# Patient Record
Sex: FEMALE | Race: WHITE | NOT HISPANIC OR LATINO | URBAN - METROPOLITAN AREA
[De-identification: names, ages, dates, MRNs, and addresses within clinical notes are randomized per-mention and may not be internally consistent; named-entity substitution may affect disease eponyms.]

---

## 2021-07-28 ENCOUNTER — NEW PATIENT COMPREHENSIVE (OUTPATIENT)
Dept: URBAN - METROPOLITAN AREA CLINIC 50 | Facility: CLINIC | Age: 76
End: 2021-07-28

## 2021-07-28 DIAGNOSIS — H52.4: ICD-10-CM

## 2021-07-28 DIAGNOSIS — H35.3130: ICD-10-CM

## 2021-07-28 DIAGNOSIS — H25.13: ICD-10-CM

## 2021-07-28 DIAGNOSIS — H35.363: ICD-10-CM

## 2021-07-28 PROCEDURE — 92134 CPTRZ OPH DX IMG PST SGM RTA: CPT

## 2021-07-28 PROCEDURE — 92015 DETERMINE REFRACTIVE STATE: CPT

## 2021-07-28 PROCEDURE — 92004 COMPRE OPH EXAM NEW PT 1/>: CPT

## 2021-07-28 ASSESSMENT — TONOMETRY
OS_IOP_MMHG: 14
OD_IOP_MMHG: 14

## 2021-07-28 ASSESSMENT — VISUAL ACUITY
OS_SC: 20/60+2
OS_CC: 20/80
OD_CC: 20/50-1
OD_SC: 20/70-1
OU_CC: J1
OD_GLARE: 20/50
OD_GLARE: 20/60
OS_GLARE: 20/50
OS_GLARE: 20/60

## 2021-07-28 NOTE — PATIENT DISCUSSION
Rx finalized advised not to fill due to having catract surgery in the near future. Informed patient that Rx still would not qualify for passing the DMV form. Will update Rx at 4 week cataract sx PO OU.

## 2021-07-28 NOTE — PATIENT DISCUSSION
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 Due to PXE, additional maneuvers may be necessary including capsular tension rings and pupillary expansion devices.

## 2021-08-26 ENCOUNTER — BIOMETRY (OUTPATIENT)
Dept: URBAN - METROPOLITAN AREA CLINIC 50 | Facility: CLINIC | Age: 76
End: 2021-08-26

## 2021-08-26 DIAGNOSIS — H25.13: ICD-10-CM

## 2021-08-26 PROCEDURE — 92136 OPHTHALMIC BIOMETRY: CPT

## 2021-08-26 ASSESSMENT — KERATOMETRY
OS_AXISANGLE_DEGREES: 180
OS_K1POWER_DIOPTERS: 43.00
OD_AXISANGLE_DEGREES: 40
OD_AXISANGLE2_DEGREES: 130
OS_K2POWER_DIOPTERS: 43.00
OD_K1POWER_DIOPTERS: 43.00
OS_AXISANGLE2_DEGREES: 90
OD_K2POWER_DIOPTERS: 42.50

## 2021-08-26 NOTE — PATIENT DISCUSSION
Advised regular use of Amsler grid.
Discussed AREDS supplements, BP Control, and dark leafy green vegetables.
Instructed to call immediately if any new distortion, blurring, decreased vision or eye pain.
No treatment at this time. Observation recommended.
OCT done 07/28/2021/ OS first.
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 Due to PXE, additional maneuvers may be necessary including capsular tension rings and pupillary expansion devices.
PRE-OPERATIVE TESTING: Biometry and IOL calculations performed. Surgeon to review and select with patient at pre-op appointment.
Patient understands condition, prognosis and need for follow up care.
Recommended observation.
Rx finalized advised not to fill due to having catract surgery in the near future. Informed patient that Rx still would not qualify for passing the DMV form. Will update Rx at 4 week cataract sx PO OU.
VISUALLY SIGNIFICANT: Informed patient that their cataract is visually significant and that surgery is recommended to improve patient's visual acuity and/or glare symptoms. RBAs of surgery discussed and questions answered. Patient to schedule biometry measurements and surgery.
Pt pain well controlled, resting comfortably. Will continue to monitor and observe, pain medication PRN. Awaiting MRI.

## 2021-09-08 ENCOUNTER — PRE OP - CE/IOL OS (OUTPATIENT)
Dept: URBAN - METROPOLITAN AREA CLINIC 50 | Facility: CLINIC | Age: 76
End: 2021-09-08

## 2021-09-08 DIAGNOSIS — H35.363: ICD-10-CM

## 2021-09-08 DIAGNOSIS — H25.12: ICD-10-CM

## 2021-09-08 PROCEDURE — 92134 CPTRZ OPH DX IMG PST SGM RTA: CPT

## 2021-09-08 PROCEDURE — PREOP PRE OP VISIT

## 2021-09-08 ASSESSMENT — KERATOMETRY
OD_AXISANGLE2_DEGREES: 130
OD_K2POWER_DIOPTERS: 42.50
OS_AXISANGLE_DEGREES: 180
OS_K2POWER_DIOPTERS: 43.00
OS_AXISANGLE2_DEGREES: 90
OD_K1POWER_DIOPTERS: 43.00
OD_AXISANGLE_DEGREES: 40
OS_K1POWER_DIOPTERS: 43.00

## 2021-09-08 ASSESSMENT — VISUAL ACUITY
OD_CC: 20/50
OS_CC: 20/80

## 2021-09-08 ASSESSMENT — TONOMETRY
OD_IOP_MMHG: 14
OS_IOP_MMHG: 14

## 2021-09-08 NOTE — PATIENT DISCUSSION
Patient aware 20/20 vision cannot be guaranteed and is limited due to history of Intermediate Dry ARMD OU.

## 2021-09-08 NOTE — PATIENT DISCUSSION
CATARACT SURGERY PLANNER - STANDARD IOL/+FEMTO: Phacoemulsification with IOL: Eye: LEFT|DOS: 9/14/21|Model: DIBOO|Power: 22.50|Target: PLANO|Femto: YES|Arcs: 40 @ 0 ; 40 @ 180|Visc: DUET|Omidria: YES|10% Phenylephrine: YES|Epi-shugarcaine: YES|Phaco Setting: DENSE|BSS+: NO|Trypan Blue: NO|CTR: POSSIBLE|Olive Tip: NO|Atropine: YES @[facility]|Pupilloplasty: NO|Notes: CAUTION OF LEFT ARM AND HIP. POSSIBLE CTR, BACKUP LENS [id number] +21.00. ATROPTINE @[facility].

## 2021-09-08 NOTE — PATIENT DISCUSSION
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 Due to PXE, additional maneuvers may be necessary including capsular tension rings and pupillary expansion devices.

## 2021-09-14 ENCOUNTER — SURGERY/PROCEDURE (OUTPATIENT)
Dept: URBAN - METROPOLITAN AREA SURGERY 16 | Facility: SURGERY | Age: 76
End: 2021-09-14

## 2021-09-14 DIAGNOSIS — H25.12: ICD-10-CM

## 2021-09-14 PROCEDURE — 66984 XCAPSL CTRC RMVL W/O ECP: CPT

## 2021-09-14 PROCEDURE — DIB00FEMTO EYHANCE MONOFOCAL IOL WITH FEMTO

## 2021-09-14 RX ORDER — BRIMONIDINE TARTRATE, TIMOLOL MALEATE 2; 5 MG/ML; MG/ML
1 SOLUTION/ DROPS OPHTHALMIC TWICE A DAY
Start: 2021-09-14

## 2021-09-14 ASSESSMENT — KERATOMETRY
OS_K1POWER_DIOPTERS: 43.00
OD_K1POWER_DIOPTERS: 43.00
OS_AXISANGLE_DEGREES: 180
OD_AXISANGLE2_DEGREES: 130
OS_K2POWER_DIOPTERS: 43.00
OD_K2POWER_DIOPTERS: 42.50
OS_AXISANGLE2_DEGREES: 90
OD_AXISANGLE_DEGREES: 40

## 2021-09-15 ENCOUNTER — 1 DAY PO - CE/IOL (OUTPATIENT)
Dept: URBAN - METROPOLITAN AREA CLINIC 50 | Facility: CLINIC | Age: 76
End: 2021-09-15

## 2021-09-15 DIAGNOSIS — Z96.1: ICD-10-CM

## 2021-09-15 PROCEDURE — 99024 POSTOP FOLLOW-UP VISIT: CPT

## 2021-09-15 RX ORDER — SODIUM CHLORIDE 50 MG/ML: 1 SOLUTION OPHTHALMIC

## 2021-09-15 RX ORDER — PREDNISOLONE ACETATE 10 MG/ML: 1 SUSPENSION/ DROPS OPHTHALMIC

## 2021-09-15 ASSESSMENT — VISUAL ACUITY: OS_SC: CF 2FT

## 2021-09-15 ASSESSMENT — TONOMETRY: OS_IOP_MMHG: 17

## 2021-09-15 NOTE — PATIENT DISCUSSION
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  Will need retina referral for likely sutured IOL.  Will refer on Friday once cornea is clearer.  IOP stable.

## 2021-09-17 ENCOUNTER — 1 DAY PO - CE/IOL (OUTPATIENT)
Dept: URBAN - METROPOLITAN AREA CLINIC 53 | Facility: CLINIC | Age: 76
End: 2021-09-17

## 2021-09-17 DIAGNOSIS — Z96.1: ICD-10-CM

## 2021-09-17 PROCEDURE — 99024 POSTOP FOLLOW-UP VISIT: CPT

## 2021-09-17 ASSESSMENT — TONOMETRY: OS_IOP_MMHG: 14

## 2021-09-17 ASSESSMENT — VISUAL ACUITY: OS_SC: HM @ 2FT

## 2021-09-17 NOTE — PATIENT DISCUSSION
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  Will need retina referral for likely sutured IOL.  Will refer today.

## 2021-09-17 NOTE — PATIENT DISCUSSION
Will cancel all appointments related to cataract surgery OD until OS is cleared. Pt verbally understood.

## 2021-12-01 ENCOUNTER — PRE OP - CE/IOL OD (OUTPATIENT)
Dept: URBAN - METROPOLITAN AREA CLINIC 50 | Facility: CLINIC | Age: 76
End: 2021-12-01

## 2021-12-01 DIAGNOSIS — H35.363: ICD-10-CM

## 2021-12-01 DIAGNOSIS — H35.033: ICD-10-CM

## 2021-12-01 DIAGNOSIS — H25.811: ICD-10-CM

## 2021-12-01 PROCEDURE — 92136 - 2N OPHTHALMIC BIOMETRY BY PARTIAL COHERENCE INTERFEROMETRY WITH INTRAOCULAR LENS POWER CALCULATION

## 2021-12-01 PROCEDURE — 92134 CPTRZ OPH DX IMG PST SGM RTA: CPT

## 2021-12-01 PROCEDURE — 92012 INTRM OPH EXAM EST PATIENT: CPT

## 2021-12-01 ASSESSMENT — VISUAL ACUITY
OS_SC: 20/80
OS_PH: 20/50-2
OD_PH: 20/50-1
OU_SC: 20/70-1
OD_SC: 20/70-1

## 2021-12-01 ASSESSMENT — TONOMETRY
OS_IOP_MMHG: 14
OD_IOP_MMHG: 14

## 2021-12-01 NOTE — PATIENT DISCUSSION
CATARACT SURGERY PLANNER - STANDARD IOL/NO FEMTO: Phacoemulsification with IOL: Eye: OD|DOS: 12/7/2021|Model: AABOO|Power: +22. 5|Target: PLANO|Visc: DUET|Omidria: YES|10% Phenylephrine: YES|Epi-shugarcaine: YES|Phaco Setting: DENSE|BSS+: NO|Trypan Blue: NO|CTR: NO|Olive Tip: NO|Atropine: NO|Pupilloplasty: NO|Notes: DENSE/ LATEX ALLERGY/ LEFT SIDE PRECAUTIONS.

## 2021-12-01 NOTE — PATIENT DISCUSSION
Discussed with patient that femtosecond laser-assisted cataract surgery reduces the use of ultrasound, causes less corneal endothelial injury, and provides quicker visual recovery compared to conventional phacoemulsification. The astigmatic portion of Marisol Armstrong will help reduce astigmatism at the time of cataract surgery.

## 2021-12-07 ENCOUNTER — SURGERY/PROCEDURE (OUTPATIENT)
Dept: URBAN - METROPOLITAN AREA SURGERY 16 | Facility: SURGERY | Age: 76
End: 2021-12-07

## 2021-12-07 ENCOUNTER — POST-OP (OUTPATIENT)
Dept: URBAN - METROPOLITAN AREA CLINIC 52 | Facility: CLINIC | Age: 76
End: 2021-12-07

## 2021-12-07 DIAGNOSIS — H25.811: ICD-10-CM

## 2021-12-07 DIAGNOSIS — Z98.41: ICD-10-CM

## 2021-12-07 PROCEDURE — 99024 POSTOP FOLLOW-UP VISIT: CPT

## 2021-12-07 PROCEDURE — 66984 XCAPSL CTRC RMVL W/O ECP: CPT

## 2021-12-07 RX ORDER — SODIUM CHLORIDE 50 MG/ML: 1 SOLUTION OPHTHALMIC

## 2021-12-07 ASSESSMENT — VISUAL ACUITY: OD_SC: 20/200

## 2021-12-07 ASSESSMENT — TONOMETRY
OD_IOP_MMHG: 10
OD_IOP_MMHG: 35

## 2021-12-07 NOTE — PATIENT DISCUSSION
Patient is doing well post-operatively. IOP elevated after surgery- 35. Burped in office, rechecked IOP- 10. Instilled 1 gtt of Besivance.

## 2021-12-15 ENCOUNTER — POST-OP (OUTPATIENT)
Dept: URBAN - METROPOLITAN AREA CLINIC 50 | Facility: CLINIC | Age: 76
End: 2021-12-15

## 2021-12-15 DIAGNOSIS — Z98.41: ICD-10-CM

## 2021-12-15 DIAGNOSIS — H35.033: ICD-10-CM

## 2021-12-15 PROCEDURE — 92134 CPTRZ OPH DX IMG PST SGM RTA: CPT

## 2021-12-15 PROCEDURE — 99024 POSTOP FOLLOW-UP VISIT: CPT

## 2021-12-15 ASSESSMENT — TONOMETRY: OD_IOP_MMHG: 15

## 2021-12-15 ASSESSMENT — VISUAL ACUITY: OD_SC: 20/30-2

## 2022-01-05 ENCOUNTER — POST-OP (OUTPATIENT)
Dept: URBAN - METROPOLITAN AREA CLINIC 50 | Facility: CLINIC | Age: 77
End: 2022-01-05

## 2022-01-05 DIAGNOSIS — H43.811: ICD-10-CM

## 2022-01-05 DIAGNOSIS — H35.371: ICD-10-CM

## 2022-01-05 DIAGNOSIS — Z98.41: ICD-10-CM

## 2022-01-05 PROCEDURE — 92015 DETERMINE REFRACTIVE STATE: CPT

## 2022-01-05 PROCEDURE — 99024 POSTOP FOLLOW-UP VISIT: CPT

## 2022-01-05 ASSESSMENT — TONOMETRY
OS_IOP_MMHG: 13
OD_IOP_MMHG: 12

## 2022-01-05 ASSESSMENT — VISUAL ACUITY
OD_SC: 20/30-2
OS_SC: 20/50-1
OU_SC: 20/30-2

## 2022-04-07 ENCOUNTER — ESTABLISHED PATIENT (OUTPATIENT)
Dept: URBAN - METROPOLITAN AREA CLINIC 50 | Facility: CLINIC | Age: 77
End: 2022-04-07

## 2022-04-07 DIAGNOSIS — H43.811: ICD-10-CM

## 2022-04-07 DIAGNOSIS — H35.3130: ICD-10-CM

## 2022-04-07 DIAGNOSIS — Z96.1: ICD-10-CM

## 2022-04-07 DIAGNOSIS — H35.371: ICD-10-CM

## 2022-04-07 DIAGNOSIS — H35.033: ICD-10-CM

## 2022-04-07 DIAGNOSIS — H27.112: ICD-10-CM

## 2022-04-07 PROCEDURE — 92134 CPTRZ OPH DX IMG PST SGM RTA: CPT

## 2022-04-07 PROCEDURE — 92014 COMPRE OPH EXAM EST PT 1/>: CPT

## 2022-04-07 ASSESSMENT — TONOMETRY
OS_IOP_MMHG: 16
OD_IOP_MMHG: 14

## 2022-04-07 ASSESSMENT — VISUAL ACUITY
OS_CC: 20/30-2
OU_CC: J1+@16IN
OS_PH: 20/25+1
OD_CC: 20/25-1
OD_GLARE: 20/40
OD_GLARE: 20/30
OS_GLARE: 20/40
OS_GLARE: 20/50
OU_CC: 20/25-2